# Patient Record
Sex: FEMALE | Race: WHITE
[De-identification: names, ages, dates, MRNs, and addresses within clinical notes are randomized per-mention and may not be internally consistent; named-entity substitution may affect disease eponyms.]

---

## 2020-05-01 LAB
ALBUMIN SERPL BCP-MCNC: 3.6 G/DL (ref 3.4–5)
ALBUMIN/GLOB SERPL: 0.9 {RATIO} (ref 1.1–1.5)
ALP SERPL-CCNC: 71 IU/L (ref 46–116)
ALT SERPL W P-5'-P-CCNC: 34 U/L (ref 30–65)
ANION GAP SERPL CALCULATED.3IONS-SCNC: 9 MMOL/L (ref 8–16)
AST SERPL W P-5'-P-CCNC: 21 U/L (ref 10–37)
BASOPHILS # BLD AUTO: 0.1 X10'3 (ref 0–0.2)
BASOPHILS NFR BLD AUTO: 1 % (ref 0–1)
BILIRUB SERPL-MCNC: 0.3 MG/DL (ref 0–1)
BUN SERPL-MCNC: 19 MG/DL (ref 7–18)
BUN/CREAT SERPL: 20.7 (ref 6.6–38)
CALCIUM SERPL-MCNC: 9.2 MG/DL (ref 8.5–10.1)
CHLORIDE SERPL-SCNC: 106 MMOL/L (ref 99–107)
CLARITY UR: CLEAR
CO2 SERPL-SCNC: 26.8 MMOL/L (ref 24–32)
COLOR UR: YELLOW
CREAT SERPL-MCNC: 0.92 MG/DL (ref 0.4–0.9)
EOSINOPHIL # BLD AUTO: 0.1 X10'3 (ref 0–0.9)
EOSINOPHIL NFR BLD AUTO: 1.3 % (ref 0–6)
ERYTHROCYTE [DISTWIDTH] IN BLOOD BY AUTOMATED COUNT: 13.9 % (ref 11.5–14.5)
GFR SERPL CREATININE-BSD FRML MDRD: 64 ML/MIN
GLUCOSE SERPL-MCNC: 103 MG/DL (ref 70–104)
GLUCOSE UR STRIP-MCNC: NEGATIVE MG/DL
HCT VFR BLD AUTO: 42.8 % (ref 35–45)
HGB BLD-MCNC: 14.4 G/DL (ref 12–16)
HGB UR QL STRIP: NEGATIVE
KETONES UR STRIP-MCNC: NEGATIVE MG/DL
LEUKOCYTE ESTERASE UR QL STRIP: NEGATIVE
LYMPHOCYTES # BLD AUTO: 2.5 X10'3 (ref 1.1–4.8)
LYMPHOCYTES NFR BLD AUTO: 34.2 % (ref 21–51)
MCH RBC QN AUTO: 30.7 PG (ref 27–31)
MCHC RBC AUTO-ENTMCNC: 33.8 G/DL (ref 33–36.5)
MCV RBC AUTO: 91 FL (ref 78–98)
MONOCYTES # BLD AUTO: 0.9 X10'3 (ref 0–0.9)
MONOCYTES NFR BLD AUTO: 11.9 % (ref 2–12)
NEUTROPHILS # BLD AUTO: 3.8 X10'3 (ref 1.8–7.7)
NEUTROPHILS NFR BLD AUTO: 51.6 % (ref 42–75)
NITRITE UR QL STRIP: NEGATIVE
PH UR STRIP: 7 [PH] (ref 4.8–8)
PLATELET # BLD AUTO: 218 X10'3 (ref 140–440)
PMV BLD AUTO: 9 FL (ref 7.4–10.4)
POTASSIUM SERPL-SCNC: 3.9 MMOL/L (ref 3.4–5.1)
PROT SERPL-MCNC: 7.5 G/DL (ref 6.4–8.2)
PROT UR QL STRIP: NEGATIVE MG/DL
RBC # BLD AUTO: 4.7 X10'6 (ref 4.2–5.6)
SODIUM SERPL-SCNC: 142 MMOL/L (ref 135–145)
SP GR UR STRIP: 1.02 (ref 1–1.03)
URN COLLECT METHOD CLASS: (no result)
UROBILINOGEN UR STRIP-MCNC: 0.2 E.U/DL (ref 0.2–1)

## 2020-05-06 ENCOUNTER — HOSPITAL ENCOUNTER (OUTPATIENT)
Dept: HOSPITAL 94 - PAS | Age: 55
Discharge: HOME | End: 2020-05-06
Attending: SURGERY
Payer: MEDICAID

## 2020-05-06 VITALS — SYSTOLIC BLOOD PRESSURE: 138 MMHG | DIASTOLIC BLOOD PRESSURE: 71 MMHG

## 2020-05-06 VITALS — HEIGHT: 64 IN | WEIGHT: 280 LBS | BODY MASS INDEX: 47.8 KG/M2

## 2020-05-06 VITALS — DIASTOLIC BLOOD PRESSURE: 90 MMHG | SYSTOLIC BLOOD PRESSURE: 150 MMHG

## 2020-05-06 VITALS — SYSTOLIC BLOOD PRESSURE: 127 MMHG | DIASTOLIC BLOOD PRESSURE: 73 MMHG

## 2020-05-06 VITALS — DIASTOLIC BLOOD PRESSURE: 75 MMHG | SYSTOLIC BLOOD PRESSURE: 145 MMHG

## 2020-05-06 VITALS — DIASTOLIC BLOOD PRESSURE: 98 MMHG | SYSTOLIC BLOOD PRESSURE: 166 MMHG

## 2020-05-06 VITALS — DIASTOLIC BLOOD PRESSURE: 85 MMHG | SYSTOLIC BLOOD PRESSURE: 167 MMHG

## 2020-05-06 VITALS — DIASTOLIC BLOOD PRESSURE: 56 MMHG | SYSTOLIC BLOOD PRESSURE: 127 MMHG

## 2020-05-06 VITALS — SYSTOLIC BLOOD PRESSURE: 140 MMHG | DIASTOLIC BLOOD PRESSURE: 73 MMHG

## 2020-05-06 VITALS — SYSTOLIC BLOOD PRESSURE: 125 MMHG | DIASTOLIC BLOOD PRESSURE: 71 MMHG

## 2020-05-06 VITALS — SYSTOLIC BLOOD PRESSURE: 135 MMHG | DIASTOLIC BLOOD PRESSURE: 74 MMHG

## 2020-05-06 VITALS — SYSTOLIC BLOOD PRESSURE: 118 MMHG | DIASTOLIC BLOOD PRESSURE: 69 MMHG

## 2020-05-06 VITALS — DIASTOLIC BLOOD PRESSURE: 68 MMHG | SYSTOLIC BLOOD PRESSURE: 143 MMHG

## 2020-05-06 VITALS — SYSTOLIC BLOOD PRESSURE: 123 MMHG | DIASTOLIC BLOOD PRESSURE: 74 MMHG

## 2020-05-06 VITALS — SYSTOLIC BLOOD PRESSURE: 124 MMHG | DIASTOLIC BLOOD PRESSURE: 68 MMHG

## 2020-05-06 VITALS — DIASTOLIC BLOOD PRESSURE: 74 MMHG | SYSTOLIC BLOOD PRESSURE: 138 MMHG

## 2020-05-06 DIAGNOSIS — E66.9: ICD-10-CM

## 2020-05-06 DIAGNOSIS — Z88.5: ICD-10-CM

## 2020-05-06 DIAGNOSIS — Z98.890: ICD-10-CM

## 2020-05-06 DIAGNOSIS — J45.909: ICD-10-CM

## 2020-05-06 DIAGNOSIS — M19.90: ICD-10-CM

## 2020-05-06 DIAGNOSIS — Z91.040: ICD-10-CM

## 2020-05-06 DIAGNOSIS — Z11.59: ICD-10-CM

## 2020-05-06 DIAGNOSIS — K80.10: Primary | ICD-10-CM

## 2020-05-06 DIAGNOSIS — Z91.09: ICD-10-CM

## 2020-05-06 DIAGNOSIS — Z88.2: ICD-10-CM

## 2020-05-06 DIAGNOSIS — Z82.49: ICD-10-CM

## 2020-05-06 DIAGNOSIS — Z72.89: ICD-10-CM

## 2020-05-06 DIAGNOSIS — Z79.899: ICD-10-CM

## 2020-05-06 DIAGNOSIS — Z88.8: ICD-10-CM

## 2020-05-06 PROCEDURE — 81003 URINALYSIS AUTO W/O SCOPE: CPT

## 2020-05-06 PROCEDURE — 85025 COMPLETE CBC W/AUTO DIFF WBC: CPT

## 2020-05-06 PROCEDURE — 47562 LAPAROSCOPIC CHOLECYSTECTOMY: CPT

## 2020-05-06 PROCEDURE — 82948 REAGENT STRIP/BLOOD GLUCOSE: CPT

## 2020-05-06 PROCEDURE — 87635 SARS-COV-2 COVID-19 AMP PRB: CPT

## 2020-05-06 PROCEDURE — 36415 COLL VENOUS BLD VENIPUNCTURE: CPT

## 2020-05-06 PROCEDURE — 80053 COMPREHEN METABOLIC PANEL: CPT

## 2020-05-06 NOTE — NUR
NAUSEA RESOLVED, PT UP OOB TO BATHROOM FOR VOID W/O DIFFICULTY, D/C INSTRUCTIONS GIVEN AND 
GONE OVER W/PT WHO VERBALIZED UNDERSTANDING, PT D/CD TO HOME VIA W/C TO PRIVATE VEHICLE W/O 
INCIDENT.

-------------------------------------------------------------------------------

Addendum: 05/06/20 at 1524 by Lilia Sommers RN

-------------------------------------------------------------------------------

Amended: Links added.

## 2020-05-06 NOTE — NUR
Received from OR via [facility], accompanied by Anesthesiologist DR DOUGLAS and report given 
by Anesthesiologist. PT DROWSY, DENIES PAIN, ABDOMEN W/5 LAP SITES W/BANDAIDS CDI.

-------------------------------------------------------------------------------

Addendum: 05/06/20 at 1348 by Lilia Sommers RN

-------------------------------------------------------------------------------

Amended: Links added.

## 2024-08-10 ENCOUNTER — HOSPITAL ENCOUNTER (OUTPATIENT)
Dept: HOSPITAL 94 - MRI | Age: 59
Discharge: HOME | End: 2024-08-10
Attending: NURSE PRACTITIONER
Payer: MEDICAID

## 2024-08-10 DIAGNOSIS — M43.22: ICD-10-CM

## 2024-08-10 DIAGNOSIS — M47.812: ICD-10-CM

## 2024-08-10 DIAGNOSIS — R20.9: ICD-10-CM

## 2024-08-10 DIAGNOSIS — M48.03: ICD-10-CM

## 2024-08-10 DIAGNOSIS — M62.838: ICD-10-CM

## 2024-08-10 DIAGNOSIS — M50.30: Primary | ICD-10-CM

## 2024-08-10 PROCEDURE — 72141 MRI NECK SPINE W/O DYE: CPT
